# Patient Record
Sex: FEMALE | Employment: STUDENT | URBAN - METROPOLITAN AREA
[De-identification: names, ages, dates, MRNs, and addresses within clinical notes are randomized per-mention and may not be internally consistent; named-entity substitution may affect disease eponyms.]

---
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
## 2025-01-28 ENCOUNTER — PATIENT MESSAGE (OUTPATIENT)
Dept: FAMILY MEDICINE CLINIC | Facility: CLINIC | Age: 20
End: 2025-01-28

## 2025-01-28 ENCOUNTER — HOSPITAL ENCOUNTER (OUTPATIENT)
Age: 20
Discharge: HOME | End: 2025-01-28
Payer: COMMERCIAL

## 2025-01-28 ENCOUNTER — APPOINTMENT (OUTPATIENT)
Dept: GENERAL RADIOLOGY | Age: 20
End: 2025-01-28
Payer: COMMERCIAL

## 2025-01-28 VITALS
DIASTOLIC BLOOD PRESSURE: 74 MMHG | OXYGEN SATURATION: 98 % | RESPIRATION RATE: 18 BRPM | HEART RATE: 78 BPM | TEMPERATURE: 99 F | SYSTOLIC BLOOD PRESSURE: 119 MMHG

## 2025-01-28 DIAGNOSIS — S92.425A CLOSED NONDISPLACED FRACTURE OF DISTAL PHALANX OF LEFT GREAT TOE, INITIAL ENCOUNTER: Primary | ICD-10-CM

## 2025-01-28 PROCEDURE — L3260 AMBULATORY SURGICAL BOOT EAC: HCPCS | Performed by: NURSE PRACTITIONER

## 2025-01-28 PROCEDURE — 99213 OFFICE O/P EST LOW 20 MIN: CPT | Performed by: NURSE PRACTITIONER

## 2025-01-28 PROCEDURE — 73630 X-RAY EXAM OF FOOT: CPT

## 2025-01-28 NOTE — ED PROVIDER NOTES
He    Patient Seen in: [facility]      History     Chief Complaint   Patient presents with    Foot Injury     Stated Complaint: lft foot/toes injury by 35 lb weight  Subjective:   19-year-old healthy female presents for a left foot injury that occurred at approximately 10 AM this morning.  She states she dropped a 35 pound weight on her left foot near the base of the left great toe. No deformity.  There is pain with bruising and swelling to the base of the left great toe.  No deformity.  Able to flex and extend with discomfort.  The nailbed is intact.  The skin is intact.  Able to bear weight with a limp.  No history of a fracture to this foot in the past.  No change in sensation.  She appears nontoxic.      Objective:   History reviewed. No pertinent past medical history.         History reviewed. No pertinent surgical history.           Social History     Socioeconomic History    Marital status: Single   Tobacco Use    Smoking status: Never    Smokeless tobacco: Never   Vaping Use    Vaping status: Never Used   Substance and Sexual Activity    Alcohol use: Never    Drug use: Never            Review of Systems    Positive for stated complaint: Foot Injury     Other systems are as noted in HPI.  Constitutional and vital signs reviewed.      All other systems reviewed and negative except as noted above.    Physical Exam     ED Triage Vitals [01/28/25 1254]   BP (!) 130/4   Pulse 83   Resp 18   Temp 98.8 °F (37.1 °C)   Temp src Oral   SpO2 100 %   O2 Device None (Room air)     Current:/74   Pulse 78   Temp 98.8 °F (37.1 °C) (Oral)   Resp 18   LMP 07/30/2024 (Approximate)   SpO2 98%     Physical Exam  Vitals and nursing note reviewed.   Constitutional:       General: She is not in acute distress.     Appearance: Normal appearance. She is not toxic-appearing.   Pulmonary:      Effort: Pulmonary effort is normal.   Musculoskeletal:         General: Swelling, tenderness and signs of injury present.  No deformity.      Comments: Pain to the base of the left great toe with flexion and extension.  No deformity.  The nailbed and skin are intact.  Bruising and swelling is present.  Ambulatory with a limp.  CMS intact.   Skin:     General: Skin is warm and dry.      Capillary Refill: Capillary refill takes less than 2 seconds.      Findings: Bruising present. No erythema or rash.   Neurological:      General: No focal deficit present.      Mental Status: She is alert and oriented to person, place, and time.   Psychiatric:         Mood and Affect: Mood normal.         Behavior: Behavior normal.         ED Course   No results found.  Labs Reviewed - No data to display  XR FOOT, COMPLETE (MIN 3 VIEWS), LEFT (CPT=73630)          PROCEDURE: XR FOOT, COMPLETE (MIN 3 VIEWS), LEFT (CPT=73630)     COMPARISON: None.     INDICATIONS: Pain and swelling on dorsal aspect of left foot post trauma today.     TECHNIQUE: Three-view        Findings and impression:     There is a 3 x 2 millimeter nondisplaced intra-articular fracture at the lateral aspect of the base of the distal phalanx left great toe     No other fracture     Normal alignment  Finalized by (CST): Aristides Biggs MD on 1/28/2025 at 1:37 PM                         MDM     Medical Decision Making  The patient is aware of the x-ray results below.  Buddy tape was placed on the left great and second toes and a postop shoe was applied.  CMS intact post application.  We discussed ice, elevation, and ibuprofen as needed for pain.  I will refer to podiatry for follow-up.    Amount and/or Complexity of Data Reviewed  Independent Historian: spouse  Radiology: ordered.     Details: I visualized the x-ray images which show a small avulsion fracture to the distal phalanx of the left great toe.    Risk  OTC drugs.  Risk Details: Contusion versus fracture        Disposition and Plan     Clinical Impression:  1. Closed nondisplaced fracture of distal phalanx of left great toe, initial  encounter         Disposition:  Discharge  1/28/2025  2:02 pm    Follow-up:  Aida Franco DPM  [address] [place] [address]  [phone]    Call   To arrange a follow-up appointment.          Medications Prescribed:  Current Discharge Medication List

## 2025-01-28 NOTE — DISCHARGE INSTRUCTIONS
Ice and elevate.  Tylenol or ibuprofen as needed for pain.  Call and make a follow-up appointment with podiatry.  Return for any concerns.

## 2025-01-28 NOTE — ED INITIAL ASSESSMENT (HPI)
Pt c/o left foot injury + pain + bruising + swelling sts that a 35 lbs weight landed to her foot

## 2025-01-29 NOTE — TELEPHONE ENCOUNTER
Lessno message sent with instruction to contact the office .     LAST VISIT 8/13/2024.    [facility] note 1/28/25;  Diagnosis    Diagnosis Comment   Closed nondisplaced fracture of distal phalanx of left great toe, initial encounter         Follow-Ups: Call Aida Franco DPM ([facility]); To arrange a follow-up appointment.     Future Appointments   Date Time Provider Department Center   2/3/2025  3:10 PM Aida Franco DPM [facility]

## 2025-02-03 ENCOUNTER — OFFICE VISIT (OUTPATIENT)
Dept: PODIATRY CLINIC | Facility: CLINIC | Age: 20
End: 2025-02-03
Payer: COMMERCIAL

## 2025-02-03 ENCOUNTER — PATIENT MESSAGE (OUTPATIENT)
Dept: PODIATRY CLINIC | Facility: CLINIC | Age: 20
End: 2025-02-03

## 2025-02-03 DIAGNOSIS — S92.425A NONDISPLACED FRACTURE OF DISTAL PHALANX OF LEFT GREAT TOE, INITIAL ENCOUNTER FOR CLOSED FRACTURE: Primary | ICD-10-CM

## 2025-02-03 PROCEDURE — 99204 OFFICE O/P NEW MOD 45 MIN: CPT | Performed by: STUDENT IN AN ORGANIZED HEALTH CARE EDUCATION/TRAINING PROGRAM

## 2025-02-03 NOTE — PROGRESS NOTES
[facility] Podiatry  Progress Note      Arik Jones is a 19 year old female.   Chief Complaint   Patient presents with    Fracture     Consult left great fracture due to injury on 01/28/2025 she drop on her foot  a 35 lb weight plate at the gym by accident; her hands were sweaty. Has an XR done.       HPI:     Patient is a pleasant 19-year-old female presents to clinic today for evaluation of a left great toe fracture.  Patient sustained the injury on January 28 when she dropped a 35 pound weight plate at the gym.  Patient was seen in urgent care where she was diagnosed with a fracture to the distal phalanx.  She has been nonweightbearing with crutches and a surgical shoe.  Admits to pain with pressure and weightbearing.  Denies any other pedal complaints.    Allergies: Patient has no known allergies.    Current Outpatient Medications   Medication Sig Dispense Refill    ergocalciferol 1.25 MG (50146 UT) Oral Cap Take 1 capsule (50,000 Units total) by mouth once a week. 12 capsule 4      History reviewed. No pertinent past medical history.   History reviewed. No pertinent surgical history.   Family History   Problem Relation Age of Onset    Arthritis Maternal Grandmother     High Blood Pressure Maternal Grandmother     Diabetes Maternal Great-Grandparent       Social History     Socioeconomic History    Marital status: Single   Tobacco Use    Smoking status: Never    Smokeless tobacco: Never   Vaping Use    Vaping status: Never Used   Substance and Sexual Activity    Alcohol use: Never    Drug use: Never           REVIEW OF SYSTEMS:     Denies nause, fever, chills  No calf pain  Denies chest pain or SOB      EXAM:   LMP 07/30/2024 (Approximate)   GENERAL: well developed, well nourished, in no apparent distress  EXTREMITIES:   1. Integument: Normal skin temperature and turgor.  Nail plate is well adhered with no lifting to left great toe.  2. Vascular: Dorsalis pedis two out of four bilateral and posterior  tibial pulses two out of   four bilateral, capillary refill normal.  Ecchymosis to left great toe.   3. Musculoskeletal: Pain with palpation to left great toe   4. Neurological: Normal sharp dull sensation; reflexes normal.             ASSESSMENT AND PLAN:   There are no diagnoses linked to this encounter.    Plan:     Patient seen and examined and findings discussed with patient.  Discussed etiology of condition along with treatment options.  Reviewed left foot x-rays with patient which show a small nondisplaced fracture to the left distal phalanx.  Discussed possibility of having arthritis at the joint.  Advised patient to continue wearing the surgical shoe and slowly ease into a weightbearing status as tolerated.  Light duty work note provided for patient.  Return to clinic for follow-up in 3 weeks.    The patient indicates understanding of these issues and agrees to the plan.        Aida Franco DPM

## 2025-02-04 RX ORDER — IBUPROFEN 800 MG/1
800 TABLET, FILM COATED ORAL EVERY 6 HOURS PRN
Qty: 60 TABLET | Refills: 0 | Status: SHIPPED | OUTPATIENT
Start: 2025-02-04 | End: 2025-03-06

## 2025-04-24 ENCOUNTER — OFFICE VISIT (OUTPATIENT)
Dept: OBGYN CLINIC | Facility: CLINIC | Age: 20
End: 2025-04-24
Payer: COMMERCIAL

## 2025-04-24 VITALS
HEART RATE: 67 BPM | DIASTOLIC BLOOD PRESSURE: 63 MMHG | SYSTOLIC BLOOD PRESSURE: 100 MMHG | BODY MASS INDEX: 25 KG/M2 | WEIGHT: 137.81 LBS

## 2025-04-24 DIAGNOSIS — N94.6 DYSMENORRHEA: ICD-10-CM

## 2025-04-24 DIAGNOSIS — N92.0 MENORRHAGIA WITH REGULAR CYCLE: Primary | ICD-10-CM

## 2025-04-24 PROCEDURE — 99203 OFFICE O/P NEW LOW 30 MIN: CPT | Performed by: NURSE PRACTITIONER

## 2025-04-24 PROCEDURE — 3074F SYST BP LT 130 MM HG: CPT | Performed by: NURSE PRACTITIONER

## 2025-04-24 PROCEDURE — 3078F DIAST BP <80 MM HG: CPT | Performed by: NURSE PRACTITIONER

## 2025-04-24 NOTE — PROGRESS NOTES
The following individual(s) verbally consented to be recorded using ambient AI listening technology and understand that they can each withdraw their consent to this listening technology at any point by asking the clinician to turn off or pause the recording:    Patient name: Arik Jones

## 2025-04-24 NOTE — PROGRESS NOTES
[facility]   Obstetrics and Gynecology    Arik Jones is a 20 year old female  Patient's last menstrual period was 2025.   Chief Complaint   Patient presents with    Gyn Problem     NEW PATIENT, VERY PAINFUL MENSTRUAL CRAMPS   .   History of Present Illness  The patient, with no significant past medical history, presents with a longstanding history of heavy and painful periods. She describes severe cramping, associated with diarrhea and vomiting due to the pain. The patient also experiences loss of appetite during her menstrual cycle. The severity of symptoms varies from cycle to cycle, with the most recent cycle being less severe. However, she reports instances of feeling lightheaded due to inability to retain food. The menstrual flow is heavy for the first three days and lighter for the subsequent two to three days. The pain is most severe during the first two to three days of the cycle. The patient uses Midol for pain management, but often requires additional doses for adequate relief. The pain is accompanied by back pain and breast soreness. The heavy flow often disrupts her daily activities, causing her to miss school or work. On heavy flow days, she prefers using pads due to rapid saturation of tampons. During the last cycle, she had to change an overnight pad every thirty minutes due to heavy flow. The patient has never been on any medication for the heavy and painful periods.  She is sexually active, one male partner to date, condoms. No concerns with intercourse. Desires STI testing at next visit.    Pap:never  Contraception: condoms    OBSTETRICS HISTORY:  OB History    Para Term  AB Living   0 0 0 0 0 0   SAB IAB Ectopic Multiple Live Births   0 0 0 0 0       GYNE HISTORY:  Menarche: 13 (2025  2:19 PM)  Period Cycle (Days): MONTHLY (2025  2:19 PM)  Period Duration (Days): 5 (2025  2:19 PM)  Period Flow: HEAVY (2025  2:19 PM)  Use of Birth Control  (if yes, specify type): None (4/24/2025  2:19 PM)      History   Sexual Activity    Sexual activity: Yes     Comment: NONE       MEDICAL HISTORY:  Past Medical History[1]    SOCIAL HISTORY:  Social History     Socioeconomic History    Marital status: Single     Spouse name: Not on file    Number of children: Not on file    Years of education: Not on file    Highest education level: Not on file   Occupational History    Not on file   Tobacco Use    Smoking status: Never    Smokeless tobacco: Never   Vaping Use    Vaping status: Never Used   Substance and Sexual Activity    Alcohol use: Yes     Comment: KIERRA.    Drug use: Not Currently     Types: Cannabis    Sexual activity: Yes     Comment: NONE   Other Topics Concern    Not on file   Social History Narrative    Not on file     Social Drivers of Health     Food Insecurity: Not on file   Transportation Needs: Not on file   Stress: Not on file   Housing Stability: Not on file       MEDICATIONS:  Medications - Current[2]    ALLERGIES:  Allergies[3]      Review of Systems:  Constitutional:  Denies fatigue, night sweats, hot flashes  Cardiovascular:  denies chest pain or palpitations  Respiratory:  denies shortness of breath  Gastrointestinal:  denies heartburn, abdominal pain, diarrhea or constipation  Genitourinary:  denies dysuria, incontinence, abnormal vaginal discharge, vaginal itching   Musculoskeletal:  denies back pain.  Skin/Breast:  Denies any breast pain, lumps, or discharge.   Neurological:  denies headaches, extremity weakness or numbness.  Psychiatric: denies depression or anxiety.  Endocrine:   denies excessive thirst or urination.  Heme/Lymph:  denies history of anemia, easy bruising or bleeding.      PHYSICAL EXAM:     Vitals:    04/24/25 1417   BP: 100/63   Pulse: 67   Weight: 137 lb 12.8 oz (62.5 kg)     Body mass index is 24.8 kg/m².     Patient offered chaperone, patient declined    Constitutional: well developed, well nourished    Psychiatric:   Oriented to time, place, person and situation. Appropriate mood and affect      Assessment & Plan:    ICD-10-CM    1. Menorrhagia with regular cycle  N92.0 HCG, Beta Subunit (Quant Pregnancy Test)     TSH W Reflex To Free T4     CBC, Platelet; No Differential     Ferritin     Iron And Tibc     US PELVIS W EV (CPT=76856/27229)      2. Dysmenorrhea  N94.6 HCG, Beta Subunit (Quant Pregnancy Test)     TSH W Reflex To Free T4     CBC, Platelet; No Differential     Ferritin     Iron And Tibc     US PELVIS W EV (CPT=76856/05249)        Assessment & Plan  Dysmenorrhea and Menorrhagia  Chronic dysmenorrhea and menorrhagia with severe symptoms during menstruation. Current Midol management insufficient. Hormonal contraception considered to manage symptoms by suppressing ovulation and thinning uterine lining. Discussed all options, administration, effectiveness, and side effects.   She is most interested in kyleena or mirena.  - Order blood work for anemia and pelvic ultrasound for structural evaluation.  - Discuss hormonal contraception options: birth control pills, NuvaRing, patch, Depo Provera, progestin IUDs (Sinai, Kyleena, Mirena). Provide pamphlets on Kyleena and Mirena.  - Advise avoiding unprotected intercourse until IUD placement if chosen.  - Schedule IUD placement within 7-10 days of period start if proceeding. Instruct to contact office for scheduling.  - Advise condom use with hormonal contraception to reduce STI risk.  - Advise completion of blood work and scheduling of ultrasound.      TRU Marin        This note was prepared using Dragon Medical voice recognition dictation software. As a result errors may occur. When identified these errors have been corrected. While every attempt is made to correct errors during dictation discrepancies may still exist.         [1] History reviewed. No pertinent past medical history.  [2]   Current Outpatient Medications:     ergocalciferol 1.25 MG (68852 UT) Oral  Cap, Take 1 capsule (50,000 Units total) by mouth once a week., Disp: 12 capsule, Rfl: 4  [3] No Known Allergies

## 2025-04-29 ENCOUNTER — TELEPHONE (OUTPATIENT)
Dept: FAMILY MEDICINE CLINIC | Facility: CLINIC | Age: 20
End: 2025-04-29

## 2025-04-29 ENCOUNTER — LAB ENCOUNTER (OUTPATIENT)
Dept: LAB | Age: 20
End: 2025-04-29
Attending: FAMILY MEDICINE
Payer: COMMERCIAL

## 2025-04-29 DIAGNOSIS — Z01.84 IMMUNITY STATUS TESTING: ICD-10-CM

## 2025-04-29 DIAGNOSIS — Z01.84 IMMUNITY STATUS TESTING: Primary | ICD-10-CM

## 2025-04-29 LAB
HBV CORE AB SERPL QL IA: NONREACTIVE
HBV SURFACE AB SER QL: NONREACTIVE
HBV SURFACE AB SERPL IA-ACNC: <3.1 MIU/ML
HBV SURFACE AG SER-ACNC: <0.1 [IU]/L
HBV SURFACE AG SERPL QL IA: NONREACTIVE
RUBV IGG SER QL: POSITIVE
RUBV IGG SER-ACNC: 112 IU/ML (ref 10–?)

## 2025-04-29 PROCEDURE — 86765 RUBEOLA ANTIBODY: CPT | Performed by: FAMILY MEDICINE

## 2025-04-29 PROCEDURE — 86762 RUBELLA ANTIBODY: CPT | Performed by: FAMILY MEDICINE

## 2025-04-29 PROCEDURE — 86704 HEP B CORE ANTIBODY TOTAL: CPT

## 2025-04-29 PROCEDURE — 87340 HEPATITIS B SURFACE AG IA: CPT

## 2025-04-29 PROCEDURE — 36415 COLL VENOUS BLD VENIPUNCTURE: CPT | Performed by: FAMILY MEDICINE

## 2025-04-29 PROCEDURE — 86735 MUMPS ANTIBODY: CPT | Performed by: FAMILY MEDICINE

## 2025-04-29 PROCEDURE — 80503 PATH CLIN CONSLTJ SF 5-20: CPT

## 2025-04-29 PROCEDURE — 86480 TB TEST CELL IMMUN MEASURE: CPT | Performed by: FAMILY MEDICINE

## 2025-04-29 PROCEDURE — 86787 VARICELLA-ZOSTER ANTIBODY: CPT | Performed by: FAMILY MEDICINE

## 2025-04-29 PROCEDURE — 86706 HEP B SURFACE ANTIBODY: CPT

## 2025-04-29 NOTE — TELEPHONE ENCOUNTER
Left message to call back regarding nurse visit. There are no orders or titers for the Hep B or TB test. Need to know what is the reason for Hep B and Tb test.

## 2025-04-29 NOTE — TELEPHONE ENCOUNTER
Called patient verified full name and . Informed patient that titers were ordered and a copy of the vaccine record is ready for . Patient verbalized understanding and did not have any further questions.

## 2025-04-29 NOTE — TELEPHONE ENCOUNTER
Patient is requesting a quantiferon tb for [employer] school. Also requesting hepatitis b vaccine, I informed her she received her hepatitis b vaccines as a child. I will print a copy of her vaccine record. Additionally, patient is wondering if she should get a pcv 20 vaccine since she is a smoker. Please advise.

## 2025-04-30 LAB
MEV IGG SER-ACNC: 273 AU/ML (ref 16.5–?)
MUV IGG SER IA-ACNC: 148 AU/ML (ref 11–?)
VZV IGG SER IA-ACNC: 0.94 (ref 1–?)

## 2025-05-01 LAB
M TB IFN-G CD4+ T-CELLS BLD-ACNC: 0.04 IU/ML
M TB TUBERC IFN-G BLD QL: NEGATIVE
M TB TUBERC IGNF/MITOGEN IGNF CONTROL: 4.74 IU/ML
QFT TB1 AG MINUS NIL: -0.01 IU/ML
QFT TB2 AG MINUS NIL: -0.01 IU/ML

## 2025-05-01 NOTE — PROGRESS NOTES
You need Varicella and Hep B series since not immune to either. You are immune to MMR. Please schedule nurse visit and will give both. TB blood test is still pending. - Dr. Mak

## 2025-05-15 ENCOUNTER — NURSE ONLY (OUTPATIENT)
Dept: FAMILY MEDICINE CLINIC | Facility: CLINIC | Age: 20
End: 2025-05-15
Payer: COMMERCIAL

## 2025-05-15 DIAGNOSIS — Z23 NEED FOR VACCINATION: Primary | ICD-10-CM

## 2025-05-15 PROCEDURE — 90746 HEPB VACCINE 3 DOSE ADULT IM: CPT | Performed by: FAMILY MEDICINE

## 2025-05-15 PROCEDURE — 90471 IMMUNIZATION ADMIN: CPT | Performed by: FAMILY MEDICINE

## 2025-05-15 PROCEDURE — 90716 VAR VACCINE LIVE SUBQ: CPT | Performed by: FAMILY MEDICINE

## 2025-05-15 PROCEDURE — 90472 IMMUNIZATION ADMIN EACH ADD: CPT | Performed by: FAMILY MEDICINE

## 2025-05-28 ENCOUNTER — TELEPHONE (OUTPATIENT)
Dept: OBGYN CLINIC | Facility: CLINIC | Age: 20
End: 2025-05-28

## 2025-08-11 ENCOUNTER — OFFICE VISIT (OUTPATIENT)
Dept: FAMILY MEDICINE CLINIC | Facility: CLINIC | Age: 20
End: 2025-08-11

## 2025-08-11 VITALS
WEIGHT: 127.38 LBS | HEART RATE: 71 BPM | BODY MASS INDEX: 22.86 KG/M2 | HEIGHT: 62.5 IN | DIASTOLIC BLOOD PRESSURE: 64 MMHG | SYSTOLIC BLOOD PRESSURE: 106 MMHG

## 2025-08-11 DIAGNOSIS — E55.9 VITAMIN D DEFICIENCY: ICD-10-CM

## 2025-08-11 DIAGNOSIS — Z00.00 ROUTINE MEDICAL EXAM: Primary | ICD-10-CM

## (undated) NOTE — LETTER
[address]  Dept: [phone]  Dept Fax: [phone]  Loc: [phone]      February 26, 2017    Patient: Suzan Delgado   Date of Visit: 2/26/2017       To Whom It May Concern:     [employer]

## (undated) NOTE — LETTER
2/3/2025          To Whom It May Concern:    Arik Jones is currently under my medical care.  Please excuse Arik for 4 days.  She may return to work on 02/07/2025.  Activity is restricted as follows: please allow her to do sited light duty work for 4 weeks.  If you require additional information please contact our office.        Sincerely,    Aida Franco DPM

## (undated) NOTE — LETTER
Date & Time: 1/28/2025, 2:00 PM  Patient: Arik Jones  Encounter Provider(s):    Myles Rodriguez APRN       To Whom It May Concern:    Arik Jones was seen and treated in our department on 1/28/2025. She should not return to work until 01/30/25 .    If you have any questions or concerns, please do not hesitate to call.        _____________________________  Physician/APC Signature

## (undated) NOTE — ED AVS SNAPSHOT
[facility] in [facility] [address]  [address] [facility]    Phone:  [phone]    Fax:  [phone]           Beth Griffith   MRN: [id number]    Department:  [facility] in [address] [place] [address]   Date of Visit: Discharge References/Attachments     FINGER SPRAIN (ENGLISH)      Disclosure     Insurance plans vary and the physician(s) referred by the Immediate Care may not be covered by your plan.   It is possible that the physician may not participate in your health IF THERE IS ANY CHANGE OR WORSENING OF YOUR CONDITION, CALL YOUR PRIMARY CARE PHYSICIAN AT ONCE OR GO TO THE EMERGENCY DEPARTMENT.     If you have been prescribed any medication(s), please fill your prescription right away and begin taking the medication(s) you to explore options for quitting.     - If you have concerns related to behavioral health issues or thoughts of harming yourself, contact [facility] at [phone].     - If you don’t have insurance, Liz Hdez

## (undated) NOTE — LETTER
2/3/2025          To Whom It May Concern:    Arik Jones is currently under my medical care.  She may return to work on 02/04/2025.  Activity is restricted as follows: please allow her to do sited light duty work.    If you require additional information please contact our office.        Sincerely,    Aida Franco DPM